# Patient Record
Sex: FEMALE | Race: BLACK OR AFRICAN AMERICAN | NOT HISPANIC OR LATINO
[De-identification: names, ages, dates, MRNs, and addresses within clinical notes are randomized per-mention and may not be internally consistent; named-entity substitution may affect disease eponyms.]

---
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
## 2024-01-02 NOTE — PHYSICAL THERAPY INITIAL EVALUATION PEDIATRIC - RANGE OF MOTION EXAMINATION, REHAB
Please call one of the locations below to schedule an EMG with one of our neurologists or physiatrists:    [facility] (Dr. Lindsey): [phone]  [facility] (Dr. Acuña): [phone]    I will call after the test is done to discuss the results.    Keep an eye on the tremor.    Continue baby aspirin and cholesterol medication.   except R ankle/no ROM deficits were identified